# Patient Record
Sex: MALE | Race: BLACK OR AFRICAN AMERICAN | NOT HISPANIC OR LATINO | URBAN - METROPOLITAN AREA
[De-identification: names, ages, dates, MRNs, and addresses within clinical notes are randomized per-mention and may not be internally consistent; named-entity substitution may affect disease eponyms.]

---

## 2022-06-02 ENCOUNTER — EMERGENCY (EMERGENCY)
Facility: HOSPITAL | Age: 67
LOS: 1 days | Discharge: HOME | End: 2022-06-02
Attending: EMERGENCY MEDICINE | Admitting: EMERGENCY MEDICINE
Payer: SELF-PAY

## 2022-06-02 VITALS
OXYGEN SATURATION: 100 % | TEMPERATURE: 98 F | RESPIRATION RATE: 14 BRPM | DIASTOLIC BLOOD PRESSURE: 89 MMHG | SYSTOLIC BLOOD PRESSURE: 170 MMHG | HEART RATE: 77 BPM

## 2022-06-02 VITALS
TEMPERATURE: 98 F | HEART RATE: 102 BPM | SYSTOLIC BLOOD PRESSURE: 164 MMHG | RESPIRATION RATE: 16 BRPM | DIASTOLIC BLOOD PRESSURE: 74 MMHG

## 2022-06-02 PROCEDURE — 99284 EMERGENCY DEPT VISIT MOD MDM: CPT

## 2022-06-02 PROCEDURE — 73630 X-RAY EXAM OF FOOT: CPT | Mod: 26,RT

## 2022-06-02 PROCEDURE — 73610 X-RAY EXAM OF ANKLE: CPT | Mod: 26,RT

## 2022-06-02 NOTE — CONSULT NOTE ADULT - ASSESSMENT
66 y/o M w/ R foot injury  - Pt seen and evaluated  - RLE neurovasc intact,  TTP lateral right midfoot, mild non-pitting edema, no wounds  - R foot XR: dorsal medial displaced comminuted fractures of metatarsals 4 & 5   - Erickson compression and posterior splint applied to right lower extremity  - Pt to remain non-weight bearing to right foot  - Pod plan outpt ORIF of 4-5 metatarsal shaft fractures  - Follow up w/ Dr. Dee Ochoa at [facility], please call [phone] for appointment Next Monday  - Discussed with attending

## 2022-06-02 NOTE — ED PROVIDER NOTE - OBJECTIVE STATEMENT
HPI- Patient is a 66 y.o male with no known PMHx who presents to ED c/o Rt foot pain s/p injury 2 days ago. Pt states that he is visiting his brother from [place] and 2 days ago (on tuesday) he was on a ladder, only 3 steps up when he states ladder slid and he ended up landing on lateral aspect of Rt foot. States he everted his foot. Pt states he has minimal pain over 4th/5th metatarsals and noted some swelling to area. Pt went to Urgent care today and had xrays that showed 4th and 5th metatarsal fracture, patient was given surgical shoe and referred to ED as he has no insurance. Pt denies hitting his head, loc, back pain, cp, sob, dizziness, leg pain, hip pain or any other complaints. HPI- Patient is a 66 y.o male with no known PMHx who presents to ED c/o Rt foot pain s/p injury 2 days ago. Pt states that he is visiting his brother from [place] and 2 days ago (on tuesday) he was on a ladder, only 3 steps up when he states ladder slid and he ended up landing on lateral aspect of Rt foot. States he everted his foot. Pt states he has minimal pain over 4th/5th metatarsals and noted some swelling to area. Pt went to Urgent care today and had xrays that showed 4th and 5th metatarsal fracture, patient was given surgical shoe and referred to ED as he has no insurance. Pt denies hitting his head, loc, back pain, cp, sob, dizziness, leg pain, hip pain or any other complaints.    Attending/Marianne: 65 yo M as described above, s/p slip three rungs up and landed on rt everted ankle. Pt who rt foot swelling and pain. Pt was seen and evaluated at an urgent care and referred to the ED for possible fracture and futher management.

## 2022-06-02 NOTE — ED PROVIDER NOTE - PROGRESS NOTE DETAILS
CELESTINO High- Pt seen by podiatry and splinted. Recommend patient follow up with podiatry outpatient. Pt to remain non-weight bearing. Pt declining analgesic. All questions and concerns addressed. Strict return instruction given. No complaints noted.

## 2022-06-02 NOTE — CONSULT NOTE ADULT - SUBJECTIVE AND OBJECTIVE BOX
Podiatry pager #: [phone]/ [phone]    Patient is a 66y old  Male who presents with a chief complaint of right foot injury    HPI: HPI- Patient is a 66 y.o male with no known PMHx who presents to ED c/o Rt foot pain s/p injury 2 days ago. Pt states that he is visiting his brother from [place] and 2 days ago (on tuesday) he was on a ladder, only 3 steps up when he states ladder slid and he ended up landing on lateral aspect of Rt foot. States he everted his foot. Pt states he has minimal pain over 4th/5th metatarsals and noted some swelling to area. Pt went to Urgent care today and had xrays that showed 4th and 5th metatarsal fracture, patient was given surgical shoe and referred to ED as he has no insurance. Pt denies hitting his head, loc, back pain, cp, sob, dizziness, leg pain, hip pain or any other complaints.    PAST MEDICAL & SURGICAL HISTORY:  Right foot pain          MEDICATIONS  (STANDING):    MEDICATIONS  (PRN):      Allergies    No Known Allergies    Intolerances        VITALS:    Vital Signs Last 24 Hrs  T(C): 36.8 (02 Jun 2022 11:49), Max: 36.8 (02 Jun 2022 11:49)  T(F): 98.2 (02 Jun 2022 11:49), Max: 98.2 (02 Jun 2022 11:49)  HR: 102 (02 Jun 2022 11:49) (102 - 102)  BP: 164/74 (02 Jun 2022 11:49) (164/74 - 164/74)  BP(mean): --  RR: 16 (02 Jun 2022 11:49) (16 - 16)  SpO2: --    LABS:                CAPILLARY BLOOD GLUCOSE              LOWER EXTREMITY PHYSICAL EXAM:    Vasular: DP/PT 2/4, B/L, CFT < 3 seconds B/L, Temperature gradient warm to cool, B/L.   Neuro: Epicritic sensation intact to the level of the digits, B/L.  Musculoskeletal/Ortho: TTP lateral right midfoot, full dorsiflexion, plantarflexion and eversion strength  Skin: No open wounds, skin is supple and well hydrated      RADIOLOGY & ADDITIONAL STUDIES:    < from: Xray Ankle Complete 3 Views, Right (06.02.22 @ 13:35) >  ACC: [id number] EXAM:  XR ANKLE COMP MIN 3 VIEWS RT                        ACC: [id number] EXAM:  XR FOOT COMP MIN 3 VIEWS RT                          PROCEDURE DATE:  06/02/2022          INTERPRETATION:  TIME OF EXAM: June 2, 2022 at 1:30 PM    CLINICAL INFORMATION: Injury to right foot and ankle.    TECHNIQUE:   AP, oblique and lateral views of the right foot and ankle:    INTERPRETATION:    There are oblique fractures through the distal diaphyses of the right   fourth and fifth metatarsals. No other fractures. The joint spaces are   intact. Mild medial displacement of the distal fracture fragments.      COMPARISON:  None available      IMPRESSION:  Fractured fourth and fifth metatarsals.    --- End of Report ---            NYDIA FERNANDO MD; Attending Radiologist  This document has been electronically signed. Jun 2 2022  1:59PM    < end of copied text >

## 2022-06-02 NOTE — ED PROVIDER NOTE - PATIENT PORTAL LINK FT
You can access the FollowMyHealth Patient Portal offered by [facility] by registering at the following website: [email or web address]. By joining [employer]’s FollowMyHealth portal, you will also be able to view your health information using other applications (apps) compatible with our system.

## 2022-06-02 NOTE — ED PROVIDER NOTE - CARE PROVIDER_API CALL
Dee Ochoa (DPM)  Surgery  [address], [address] [place] [address]  Phone: [phone]  Fax: [phone]  Follow Up Time: 1-3 Days

## 2022-06-02 NOTE — ED PROVIDER NOTE - CLINICAL SUMMARY MEDICAL DECISION MAKING FREE TEXT BOX
Patient is a 66 y.o male with no known PMHx who presents to ED c/o Rt foot pain s/p injury 2 days ago.    DDx- Concern for Fx. Will rpt xrays. Pt declined analgesic at this time.

## 2022-06-02 NOTE — ED PROVIDER NOTE - NS ED ATTENDING STATEMENT MOD
This was a shared visit with the PRATIK. I reviewed and verified the documentation and independently performed the documented:

## 2022-06-02 NOTE — ED PROVIDER NOTE - NSFOLLOWUPINSTRUCTIONS_ED_ALL_ED_FT
Patient advised to follow up with PRIMARY CARE DOCTOR IN 1-2 DAYS AND PODIATRIST WITHIN WEEK  and told to return to the emergency department immediately for any new or concerning symptoms OR ANY OTHER COMPLAINTS. Patient agrees with plan.       Pt to remain non-weight bearing to right foot  Take tylenol or motrin as needed for pain  call [phone] for appointment Next Monday with podiatry

## 2022-06-02 NOTE — ED ADULT TRIAGE NOTE - CHIEF COMPLAINT QUOTE
Pt sent from [facility] with R  foot fracture.  Pt  states "he was going up a ladder and it slide"

## 2022-11-21 NOTE — ED PROVIDER NOTE - PHYSICAL EXAMINATION
Vital signs reviewed.   CONSTITUTIONAL: Well-appearing;  in no apparent distress. Non-toxic appearing.   HEAD: Normocephalic, atraumatic.  EYES:  conjunctiva and sclera WNL.  CARD: Normal S1, S2;   RESP: Normal chest excursion with respiration; breath sounds clear and equal bilaterally; no wheezes, rhonchi, or rales.  EXT/MS: moves all extremities; distal pulses are normal, +TTP over 4th and 5th Metatarsal region. No crepitus.   SKIN: Normal for age and race; warm; dry; good turgor; no apparent lesions or exudate noted. Mild swelling to dorsum of foot.   NEURO: Awake, alert, oriented x 3, no gross deficits, no motor or sensory deficit noted.  PSYCH: Normal mood; appropriate affect. Vital signs reviewed.   CONSTITUTIONAL: Well-appearing;  in no apparent distress. Non-toxic appearing.   HEAD: Normocephalic, atraumatic.  EYES:  conjunctiva and sclera WNL.  CARD: Normal S1, S2;   RESP: Normal chest excursion with respiration; breath sounds clear and equal bilaterally; no wheezes, rhonchi, or rales.  EXT/MS: moves all extremities; distal pulses are normal, +TTP over 4th and 5th Metatarsal region. No crepitus.   SKIN: Normal for age and race; warm; dry; good turgor; no apparent lesions or exudate noted. Mild swelling to dorsum of foot.   NEURO: Awake, alert, oriented x 3, no gross deficits, no motor or sensory deficit noted.  PSYCH: Normal mood; appropriate affect.    Attending/Marianne: NAD; Head-atraumatic, PERRL/EOMI, no cspine PT, supple, no PATRICK, no JVD, RRR, CTAB; Abd-soft, NT/ND, no HSM; A&Ox3, nonfocal; Rt foot-+lateral swelling and PT prox 4/5th metatarsal; +Lat malleolus swelling, no PT, +FROM; no Lisfranc PT, intact no